# Patient Record
(demographics unavailable — no encounter records)

---

## 2024-12-19 NOTE — PHYSICAL EXAM
[Normal S1, S2] : normal S1, S2 [No Murmur] : no murmur [No Rub] : no rub [No Gallop] : no gallop [Clear Lung Fields] : clear lung fields [Soft] : abdomen soft [None] : Left: none [de-identified] : awake, looking around, thin, in wheelchair

## 2024-12-19 NOTE — HISTORY OF PRESENT ILLNESS
[FreeTextEntry1] : 28M with h/o cri-du-chat  syndrome, scoliosis who presents for follow up  Referring: Dr. Moore  12/19/2024 Mother provides history   No hospitalizations since last visit. No increased work of breath, leg swelling. Appears comfortable to her.   Unable to obtain ROS from patient  9/26/24 Patient non-verbal, wheelchair bound. His mother is his primary caregiver and provides history.  EKG in PCP's office today read as AFIb, she does not have the EKG with her.   He has been in his normal state of health. No increased work of breath, swelling, wincing to suggest he is uncomfortable.  No cardiac history. Not on medications. Tube fed.   Unable to obtain ROS from patient

## 2024-12-19 NOTE — ASSESSMENT
[FreeTextEntry1] : Assessment: #Abnormal EKG in PCP's office (read as AFib per family) - EKG 9/26/24 sinus tachy 107 bpm (amplitude increased to better visualize p waves)  - Patient at his baseline state of health. No s/s fluid overload.  - Echo TDS, unable to assess RV/LV #Cri-du-Chat Syndrome #FMH of congenital heart defect in maternal uncle  10/2024 , TG 42, HDL 43, LDL 66 K 4.2, Cr 0.4  Plan: - Patient appears comfortable with no evidence of heart failure or arrhythmia - RTC PRN